# Patient Record
Sex: FEMALE | Race: WHITE | URBAN - METROPOLITAN AREA
[De-identification: names, ages, dates, MRNs, and addresses within clinical notes are randomized per-mention and may not be internally consistent; named-entity substitution may affect disease eponyms.]

---

## 2019-08-29 ENCOUNTER — FOLLOW UP ESTABLISHED (OUTPATIENT)
Dept: URBAN - METROPOLITAN AREA CLINIC 85 | Facility: CLINIC | Age: 59
End: 2019-08-29
Payer: COMMERCIAL

## 2019-08-29 DIAGNOSIS — H25.813 COMBINED FORMS OF AGE-RELATED CATARACT, BILATERAL: ICD-10-CM

## 2019-08-29 DIAGNOSIS — H40.1133 PRIMARY OPEN-ANGLE GLAUCOMA, BILATERAL, SEVERE STAGE: Primary | ICD-10-CM

## 2019-08-29 PROCEDURE — 92133 CPTRZD OPH DX IMG PST SGM ON: CPT | Performed by: OPHTHALMOLOGY

## 2019-08-29 PROCEDURE — 92014 COMPRE OPH EXAM EST PT 1/>: CPT | Performed by: OPHTHALMOLOGY

## 2019-08-29 RX ORDER — LATANOPROST 50 UG/ML
0.005 % SOLUTION OPHTHALMIC
Qty: 1 | Refills: 3 | Status: INACTIVE
Start: 2019-08-29 | End: 2019-12-06

## 2019-08-29 ASSESSMENT — INTRAOCULAR PRESSURE
OD: 30
OS: 30

## 2019-08-29 ASSESSMENT — VISUAL ACUITY
OD: 20/25
OS: 20/20

## 2019-09-26 ENCOUNTER — FOLLOW UP ESTABLISHED (OUTPATIENT)
Dept: URBAN - METROPOLITAN AREA CLINIC 85 | Facility: CLINIC | Age: 59
End: 2019-09-26
Payer: COMMERCIAL

## 2019-09-26 PROCEDURE — 92012 INTRM OPH EXAM EST PATIENT: CPT | Performed by: OPHTHALMOLOGY

## 2019-09-26 PROCEDURE — 92083 EXTENDED VISUAL FIELD XM: CPT | Performed by: OPHTHALMOLOGY

## 2019-09-26 ASSESSMENT — INTRAOCULAR PRESSURE
OD: 20
OS: 20

## 2019-12-05 ENCOUNTER — FOLLOW UP ESTABLISHED (OUTPATIENT)
Dept: URBAN - METROPOLITAN AREA CLINIC 85 | Facility: CLINIC | Age: 59
End: 2019-12-05
Payer: COMMERCIAL

## 2019-12-05 DIAGNOSIS — H40.1122 PRIMARY OPEN-ANGLE GLAUCOMA, LEFT EYE, MODERATE STAGE: ICD-10-CM

## 2019-12-05 DIAGNOSIS — H40.1113 PRIMARY OPEN-ANGLE GLAUCOMA, RIGHT EYE, SEVERE STAGE: Primary | ICD-10-CM

## 2019-12-05 PROCEDURE — 92012 INTRM OPH EXAM EST PATIENT: CPT | Performed by: OPHTHALMOLOGY

## 2019-12-05 ASSESSMENT — INTRAOCULAR PRESSURE
OD: 27
OS: 25

## 2020-10-08 ENCOUNTER — FOLLOW UP ESTABLISHED (OUTPATIENT)
Dept: URBAN - METROPOLITAN AREA CLINIC 85 | Facility: CLINIC | Age: 60
End: 2020-10-08
Payer: COMMERCIAL

## 2020-10-08 PROCEDURE — 99213 OFFICE O/P EST LOW 20 MIN: CPT | Performed by: OPHTHALMOLOGY

## 2020-10-08 ASSESSMENT — INTRAOCULAR PRESSURE
OS: 15
OD: 15

## 2021-09-13 DIAGNOSIS — M25.511 BILATERAL SHOULDER PAIN, UNSPECIFIED CHRONICITY: Primary | ICD-10-CM

## 2021-09-13 DIAGNOSIS — M25.512 BILATERAL SHOULDER PAIN, UNSPECIFIED CHRONICITY: Primary | ICD-10-CM

## 2021-09-14 ENCOUNTER — OFFICE VISIT (OUTPATIENT)
Dept: ORTHOPEDIC SURGERY | Age: 61
End: 2021-09-14
Payer: MEDICARE

## 2021-09-14 DIAGNOSIS — M75.101 TEAR OF RIGHT ROTATOR CUFF, UNSPECIFIED TEAR EXTENT, UNSPECIFIED WHETHER TRAUMATIC: Primary | ICD-10-CM

## 2021-09-14 DIAGNOSIS — M75.102 NONTRAUMATIC TEAR OF LEFT ROTATOR CUFF, UNSPECIFIED TEAR EXTENT: ICD-10-CM

## 2021-09-14 PROCEDURE — 99204 OFFICE O/P NEW MOD 45 MIN: CPT | Performed by: ORTHOPAEDIC SURGERY

## 2021-09-14 RX ORDER — MECLIZINE HYDROCHLORIDE 25 MG/1
25 TABLET ORAL 3 TIMES DAILY PRN
COMMUNITY

## 2021-09-14 RX ORDER — ASPIRIN 81 MG/1
81 TABLET ORAL DAILY
COMMUNITY

## 2021-09-14 RX ORDER — LATANOPROST 50 UG/ML
1 SOLUTION/ DROPS OPHTHALMIC NIGHTLY
COMMUNITY

## 2021-09-14 RX ORDER — GABAPENTIN 100 MG/1
100 CAPSULE ORAL 3 TIMES DAILY
COMMUNITY

## 2021-09-14 RX ORDER — ONDANSETRON 4 MG/1
4 TABLET, FILM COATED ORAL EVERY 12 HOURS PRN
COMMUNITY

## 2021-09-14 RX ORDER — PANTOPRAZOLE SODIUM 40 MG/1
40 TABLET, DELAYED RELEASE ORAL DAILY
COMMUNITY

## 2021-09-14 RX ORDER — HYDROXYZINE HYDROCHLORIDE 25 MG/1
25 TABLET, FILM COATED ORAL DAILY
COMMUNITY

## 2021-09-14 NOTE — PROGRESS NOTES
Chief Complaint   Patient presents with    Pain     NP Bilateral shoulder pain         HPI:    Patient is 64 y.o. female complaining of right worse than left bilateral shoulder pain and weakness for 6 months. She denies a specific traumatic injury to the right shoulder. Previous treatments include rest, ice, and anti-inflammatory medication and HEP without much relief. She denies any other orthopedic complaints. ROS:    Skin: (-) rash,(-) psoriasis,(-) eczema, (-)skin cancer. Neurologic: (-)numbness, (-)tingling, (-)headaches, (-) LOC. Cardiovascular: (-) Chest pain, (-) swelling in legs/feet, (-) SOB, (-) cramping in legs/feet with walking. All other review of systems negative except stated above or in HPI      No past medical history on file. No past surgical history on file. Current Outpatient Medications:     latanoprost (XALATAN) 0.005 % ophthalmic solution, 1 drop nightly, Disp: , Rfl:     meclizine (ANTIVERT) 25 MG tablet, Take 25 mg by mouth 3 times daily as needed, Disp: , Rfl:     hydrOXYzine (ATARAX) 25 MG tablet, Take 25 mg by mouth daily 1-2x nightly PRN sleep, Disp: , Rfl:     pantoprazole (PROTONIX) 40 MG tablet, Take 40 mg by mouth daily PRN, Disp: , Rfl:     ondansetron (ZOFRAN) 4 MG tablet, Take 4 mg by mouth every 12 hours as needed for Nausea or Vomiting PRN, Disp: , Rfl:     gabapentin (NEURONTIN) 100 MG capsule, Take 100 mg by mouth 3 times daily.  PRN, Disp: , Rfl:     aspirin 81 MG EC tablet, Take 81 mg by mouth daily, Disp: , Rfl:   Allergies   Allergen Reactions    Wellbutrin [Bupropion] Hives     Large hives    Seasonal Dermatitis     Social History     Socioeconomic History    Marital status: Unknown     Spouse name: Not on file    Number of children: Not on file    Years of education: Not on file    Highest education level: Not on file   Occupational History    Not on file   Tobacco Use    Smoking status: Current Every Day Smoker     Packs/day: 1.00    Smokeless tobacco: Never Used   Substance and Sexual Activity    Alcohol use: Yes     Alcohol/week: 1.0 standard drinks     Types: 1 Glasses of wine per week     Comment: occasional    Drug use: Never    Sexual activity: Not on file   Other Topics Concern    Not on file   Social History Narrative    Not on file     Social Determinants of Health     Financial Resource Strain:     Difficulty of Paying Living Expenses:    Food Insecurity:     Worried About Running Out of Food in the Last Year:     [address] in the Last Year:    Transportation Needs:     Lack of Transportation (Medical):  Lack of Transportation (Non-Medical):    Physical Activity:     Days of Exercise per Week:     Minutes of Exercise per Session:    Stress:     Feeling of Stress :    Social Connections:     Frequency of Communication with Friends and Family:     Frequency of Social Gatherings with Friends and Family:     Attends Judaism Services:     Active Member of Clubs or Organizations:     Attends Club or Organization Meetings:     Marital Status:    Intimate Partner Violence:     Fear of Current or Ex-Partner:     Emotionally Abused:     Physically Abused:     Sexually Abused:      No family history on file. Physical Exam:    There were no vitals taken for this visit. GENERAL: alert, appears stated age, cooperative, no acute distress    HEENT: Head is normocephalic, atraumatic. PERRLA. SKIN: Clean, dry, intact. There is not any cellulitis or cutaneous lesions noted in the upper extremities    PULMONARY: breathing is regular and unlabored, no acute distress    CV: The bilateral upper and lower extremities are warm and well-perfused with brisk capillary refill. 2+ pulses UE and LE bilateral.     PSYCHIATRY: Pleasant mood, appropriate behavior, follows commands    NEURO: Sensation is intact distally with light touch with no alteration.  Motor exam of the upper extremities show elbow flexion and extension, wrist flexion and extension, and finger abduction grossly intact 5/5. Upper extremity reflexes are bilaterally symmetrical and within normal limits. LYMPH: No lymphedema present distally in upper or lower extremity. MUSCULOSKELETAL:  Shoulder Exam:  Examination of the right shoulder shows: There is not a deformity. There is not erythema. There is not soft tissue swelling. Deltoid region is  tender to palpation. AC Joint is  tender to palpation. Clavicle is not tender to palpation. Bicipital Groove is  tender to palpation. Pectoralis  is not tender to palpation. Scapula/ trapezius is  tender to palpation. Left:  ROM Full, Strength: Supraspinatus 5/5, Infraspinatus 5/5, Subscapularis 5/5  Right:  /120/60, Strength: Supraspinatus 4/5, Infraspinatus 5/5, Subscapularis 5/5  Left Shoulder:  Crepitus:  no   Tenderness:  none   Effusion:   none   Impingement: negative   Empty Can:  negative   Speed's:  negative      Apprehension:  negative   Cross Arm Sign:  negative   [place]'s:  negative       Neer's:  negative      Belly Press Test:  negative      Drop Arm Test:  negative     Right Shoulder:  Crepitus:  no   Tenderness:  mild   Effusion:   non   Impingement: positive   Empty Can:  positive   Speed's: positive   Apprehension:  not tested   Cross Arm Sign:  positive   [place]'s:  positive      Neer's:  positive      Belly Press Test:  negative   Drop Arm Test:  positive           Imaging:  XR SHOULDER RIGHT (MIN 2 VIEWS)    Result Date: 9/14/2021  EXAMINATION: THREE XRAY VIEWS OF THE RIGHT SHOULDER 9/14/2021 11:48 am COMPARISON: None. HISTORY: ORDERING SYSTEM PROVIDED HISTORY: Bilateral shoulder pain, unspecified chronicity FINDINGS: No acute fracture or evidence of dislocation. There are mild osseous degenerative changes of the superolateral humeral head. No significant degenerative/arthritic changes are identified elsewhere. No retained radiopaque foreign body is identified.      1. patient. 50% or greater was spent counseling the patient.

## 2021-11-02 ENCOUNTER — OFFICE VISIT (OUTPATIENT)
Dept: ORTHOPEDIC SURGERY | Age: 61
End: 2021-11-02
Payer: MEDICARE

## 2021-11-02 VITALS — BODY MASS INDEX: 29.23 KG/M2 | HEIGHT: 63 IN | TEMPERATURE: 98 F | WEIGHT: 165 LBS

## 2021-11-02 DIAGNOSIS — M75.101 TEAR OF RIGHT ROTATOR CUFF, UNSPECIFIED TEAR EXTENT, UNSPECIFIED WHETHER TRAUMATIC: Primary | ICD-10-CM

## 2021-11-02 DIAGNOSIS — Z71.82 EXERCISE COUNSELING: ICD-10-CM

## 2021-11-02 DIAGNOSIS — M54.12 CERVICAL RADICULOPATHY: ICD-10-CM

## 2021-11-02 DIAGNOSIS — M75.102 NONTRAUMATIC TEAR OF LEFT ROTATOR CUFF, UNSPECIFIED TEAR EXTENT: ICD-10-CM

## 2021-11-02 PROCEDURE — 99214 OFFICE O/P EST MOD 30 MIN: CPT | Performed by: ORTHOPAEDIC SURGERY

## 2021-11-02 NOTE — PROGRESS NOTES
Chief Complaint   Patient presents with    Shoulder Pain     Bilateral Shoulder F/U, was called 3x for MRI appt and did not get MRI done. HPI:    Patient is 63 y.o. female complaining of right worse than left bilateral shoulder pain and weakness for 6 months. She denies a specific traumatic injury to the right shoulder. Previous treatments include rest, ice, and anti-inflammatory medication and HEP without much relief. She denies any other orthopedic complaints. However she states that the arm pain now shoots down her left lower extremity down to her fingertips. ROS:    Skin: (-) rash,(-) psoriasis,(-) eczema, (-)skin cancer. Neurologic: (-)numbness, (-)tingling, (-)headaches, (-) LOC. Cardiovascular: (-) Chest pain, (-) swelling in legs/feet, (-) SOB, (-) cramping in legs/feet with walking. All other review of systems negative except stated above or in HPI      No past medical history on file. No past surgical history on file. Current Outpatient Medications:     latanoprost (XALATAN) 0.005 % ophthalmic solution, 1 drop nightly, Disp: , Rfl:     meclizine (ANTIVERT) 25 MG tablet, Take 25 mg by mouth 3 times daily as needed, Disp: , Rfl:     hydrOXYzine (ATARAX) 25 MG tablet, Take 25 mg by mouth daily 1-2x nightly PRN sleep, Disp: , Rfl:     pantoprazole (PROTONIX) 40 MG tablet, Take 40 mg by mouth daily PRN, Disp: , Rfl:     ondansetron (ZOFRAN) 4 MG tablet, Take 4 mg by mouth every 12 hours as needed for Nausea or Vomiting PRN, Disp: , Rfl:     gabapentin (NEURONTIN) 100 MG capsule, Take 100 mg by mouth 3 times daily.  PRN, Disp: , Rfl:     aspirin 81 MG EC tablet, Take 81 mg by mouth daily, Disp: , Rfl:   Allergies   Allergen Reactions    Wellbutrin [Bupropion] Hives     Large hives    Seasonal Dermatitis     Social History     Socioeconomic History    Marital status: Unknown     Spouse name: Not on file    Number of children: Not on file    Years of education: Not on file    Highest education level: Not on file   Occupational History    Not on file   Tobacco Use    Smoking status: Current Every Day Smoker     Packs/day: 1.00    Smokeless tobacco: Never Used   Substance and Sexual Activity    Alcohol use: Yes     Alcohol/week: 1.0 standard drinks     Types: 1 Glasses of wine per week     Comment: occasional    Drug use: Never    Sexual activity: Not on file   Other Topics Concern    Not on file   Social History Narrative    Not on file     Social Determinants of Health     Financial Resource Strain:     Difficulty of Paying Living Expenses:    Food Insecurity:     Worried About Running Out of Food in the Last Year:     [address] in the Last Year:    Transportation Needs:     Lack of Transportation (Medical):  Lack of Transportation (Non-Medical):    Physical Activity:     Days of Exercise per Week:     Minutes of Exercise per Session:    Stress:     Feeling of Stress :    Social Connections:     Frequency of Communication with Friends and Family:     Frequency of Social Gatherings with Friends and Family:     Attends Mandaeism Services:     Active Member of Clubs or Organizations:     Attends Club or Organization Meetings:     Marital Status:    Intimate Partner Violence:     Fear of Current or Ex-Partner:     Emotionally Abused:     Physically Abused:     Sexually Abused:      No family history on file. Physical Exam:    Temp 98 °F (36.7 °C)   Ht 5' 3\" (1.6 m)   Wt 165 lb (74.8 kg)   BMI 29.23 kg/m²     GENERAL: alert, appears stated age, cooperative, no acute distress    HEENT: Head is normocephalic, atraumatic. PERRLA. SKIN: Clean, dry, intact. There is not any cellulitis or cutaneous lesions noted in the upper extremities    PULMONARY: breathing is regular and unlabored, no acute distress    CV: The bilateral upper and lower extremities are warm and well-perfused with brisk capillary refill.  2+ pulses UE and LE bilateral. PSYCHIATRY: Pleasant mood, appropriate behavior, follows commands    NEURO: Sensation is intact distally with light touch with no alteration. Motor exam of the upper extremities show elbow flexion and extension, wrist flexion and extension, and finger abduction grossly intact 5/5. Upper extremity reflexes are bilaterally symmetrical and within normal limits. LYMPH: No lymphedema present distally in upper or lower extremity. MUSCULOSKELETAL:  Shoulder Exam:  Examination of the right shoulder shows: There is not a deformity. There is not erythema. There is not soft tissue swelling. Deltoid region is  tender to palpation. AC Joint is  tender to palpation. Clavicle is not tender to palpation. Bicipital Groove is  tender to palpation. Pectoralis  is not tender to palpation. Scapula/ trapezius is  tender to palpation. Left:  ROM Full, Strength: Supraspinatus 5/5, Infraspinatus 5/5, Subscapularis 5/5  Right:  /120/60, Strength: Supraspinatus 4/5, Infraspinatus 5/5, Subscapularis 5/5  Left Shoulder:  Crepitus:  no   Tenderness:  none   Effusion:   none   Impingement: negative   Empty Can:  negative   Speed's:  negative      Apprehension:  negative   Cross Arm Sign:  negative   [place]'s:  negative       Neer's:  negative      Belly Press Test:  negative      Drop Arm Test:  negative     Right Shoulder:  Crepitus:  no   Tenderness:  mild   Effusion:   non   Impingement: positive   Empty Can:  positive   Speed's: positive   Apprehension:  not tested   Cross Arm Sign:  positive   [place]'s:  positive      Neer's:  positive      Belly Press Test:  negative   Drop Arm Test:  positive           Imaging:  XR SHOULDER RIGHT (MIN 2 VIEWS)    Result Date: 9/14/2021  EXAMINATION: THREE XRAY VIEWS OF THE RIGHT SHOULDER 9/14/2021 11:48 am COMPARISON: None. HISTORY: ORDERING SYSTEM PROVIDED HISTORY: Bilateral shoulder pain, unspecified chronicity FINDINGS: No acute fracture or evidence of dislocation.   There are mild osseous degenerative changes of the superolateral humeral head. No significant degenerative/arthritic changes are identified elsewhere. No retained radiopaque foreign body is identified. 1.  No acute osseous abnormality is identified. 2. Mild osseous degenerative changes of the superolateral humeral head are suspicious for underlying rotator cuff pathology. XR SHOULDER LEFT (MIN 2 VIEWS)    Result Date: 9/14/2021  EXAMINATION: THREE XRAY VIEWS OF THE LEFT SHOULDER 9/14/2021 11:48 am COMPARISON: None. HISTORY: ORDERING SYSTEM PROVIDED HISTORY: Bilateral shoulder pain, unspecified chronicity FINDINGS: No acute fracture or evidence of dislocation. There is minimal cortical remodeling of the superolateral humeral head and acromion. No significant osseous degenerative/arthritic changes are identified elsewhere. No retained radiopaque foreign body is identified. 1.  No acute osseous abnormality is identified. 2. Minimal osseous degenerative changes at the lateral shoulder. Pankaj Ward was seen today for shoulder pain. Diagnoses and all orders for this visit:    Tear of right rotator cuff, unspecified tear extent, unspecified whether traumatic  -     MRI SHOULDER RIGHT WO CONTRAST; Future    Nontraumatic tear of left rotator cuff, unspecified tear extent  -     MRI SHOULDER LEFT WO CONTRAST; Future    Cervical radiculopathy  -     Ambulatory referral to [address]        Patient seen and examined. X-rays reviewed. Patient has exam and history consistent with rotator cuff pathology. MRI recommended for further evaluation and management of possible rotator cuff tear. Return to clinic after MRI  Medical chart was reviewed from previous physicians showing treatment but no MRI; therefore new MRI is ordered. Patient was referred to physical medicine rehabilitation for evaluation of possible cervical radiculopathy.     In a 15 minute assessment and discussion, patient was counseled on continued weight loss, diet, and physical activity relating to this condition. She was educated with options in detail including nutrition, joining a health club/ weight loss program, and use of cardio equipment such as the Arc Trainer and the importance of use as well as range of motion and HEP exercises for weight loss. Patient educated about the healing rates with this condition. Patient does smoke and does have secondhand smoke exposure. In this discussion of approximately 5 minutes, patient was counseled about decrease in smoking exposure regards to healing and overall good health. Patient seems reluctant but is willing to listen to the discussion in detail. She states that she will try to cut down as much as possible and states that she will try to quit completely by the next visit. Servando Boucher, DO              25 minutes was spent with patient. 50% or greater was spent counseling the patient.

## 2021-11-17 ENCOUNTER — HOSPITAL ENCOUNTER (OUTPATIENT)
Dept: MRI IMAGING | Age: 61
Discharge: HOME | End: 2021-11-19
Payer: MEDICARE

## 2021-11-17 DIAGNOSIS — M75.102 NONTRAUMATIC TEAR OF LEFT ROTATOR CUFF, UNSPECIFIED TEAR EXTENT: ICD-10-CM

## 2021-11-17 DIAGNOSIS — M75.101 TEAR OF RIGHT ROTATOR CUFF, UNSPECIFIED TEAR EXTENT, UNSPECIFIED WHETHER TRAUMATIC: ICD-10-CM

## 2021-11-17 PROCEDURE — 73221 MRI JOINT UPR EXTREM W/O DYE: CPT

## 2021-11-18 ENCOUNTER — TELEPHONE (OUTPATIENT)
Dept: ORTHOPEDIC SURGERY | Age: 61
End: 2021-11-18

## 2021-11-18 NOTE — TELEPHONE ENCOUNTER
Patient notifying office that left shoulder MRI was only done because patient could not lay through the right one due to pain.

## 2021-11-28 NOTE — PROGRESS NOTES
Chief Complaint   Patient presents with    Shoulder Pain     left shoulder MRI Results         HPI:    Patient is 63 y.o. female complaining of right worse than left bilateral shoulder pain and weakness for 6 months. She denies a specific traumatic injury to the right shoulder. Previous treatments include rest, ice, and anti-inflammatory medication and HEP without much relief. She denies any other orthopedic complaints. However she states that the arm pain now shoots down her left lower extremity down to her fingertips. Follows up after MRI. ROS:    Skin: (-) rash,(-) psoriasis,(-) eczema, (-)skin cancer. Neurologic: (-)numbness, (-)tingling, (-)headaches, (-) LOC. Cardiovascular: (-) Chest pain, (-) swelling in legs/feet, (-) SOB, (-) cramping in legs/feet with walking. All other review of systems negative except stated above or in HPI      No past medical history on file. No past surgical history on file. Current Outpatient Medications:     latanoprost (XALATAN) 0.005 % ophthalmic solution, 1 drop nightly, Disp: , Rfl:     meclizine (ANTIVERT) 25 MG tablet, Take 25 mg by mouth 3 times daily as needed, Disp: , Rfl:     hydrOXYzine (ATARAX) 25 MG tablet, Take 25 mg by mouth daily 1-2x nightly PRN sleep, Disp: , Rfl:     pantoprazole (PROTONIX) 40 MG tablet, Take 40 mg by mouth daily PRN, Disp: , Rfl:     ondansetron (ZOFRAN) 4 MG tablet, Take 4 mg by mouth every 12 hours as needed for Nausea or Vomiting PRN, Disp: , Rfl:     gabapentin (NEURONTIN) 100 MG capsule, Take 100 mg by mouth 3 times daily.  PRN, Disp: , Rfl:     aspirin 81 MG EC tablet, Take 81 mg by mouth daily, Disp: , Rfl:   Allergies   Allergen Reactions    Wellbutrin [Bupropion] Hives     Large hives    Seasonal Dermatitis     Social History     Socioeconomic History    Marital status: Unknown     Spouse name: Not on file    Number of children: Not on file    Years of education: Not on file    Highest education level: Not on file   Occupational History    Not on file   Tobacco Use    Smoking status: Current Every Day Smoker     Packs/day: 1.00    Smokeless tobacco: Never Used   Substance and Sexual Activity    Alcohol use: Yes     Alcohol/week: 1.0 standard drinks     Types: 1 Glasses of wine per week     Comment: occasional    Drug use: Never    Sexual activity: Not on file   Other Topics Concern    Not on file   Social History Narrative    Not on file     Social Determinants of Health     Financial Resource Strain:     Difficulty of Paying Living Expenses:    Food Insecurity:     Worried About Running Out of Food in the Last Year:     [address] in the Last Year:    Transportation Needs:     Lack of Transportation (Medical):  Lack of Transportation (Non-Medical):    Physical Activity:     Days of Exercise per Week:     Minutes of Exercise per Session:    Stress:     Feeling of Stress :    Social Connections:     Frequency of Communication with Friends and Family:     Frequency of Social Gatherings with Friends and Family:     Attends [employer] Services:     Active Member of Clubs or Organizations:     Attends Club or Organization Meetings:     Marital Status:    Intimate Partner Violence:     Fear of Current or Ex-Partner:     Emotionally Abused:     Physically Abused:     Sexually Abused:      No family history on file. Physical Exam:    Temp 98 °F (36.7 °C)   Ht 5' 3\" (1.6 m)   Wt 165 lb (74.8 kg)   BMI 29.23 kg/m²     GENERAL: alert, appears stated age, cooperative, no acute distress    HEENT: Head is normocephalic, atraumatic. PERRLA. SKIN: Clean, dry, intact. There is not any cellulitis or cutaneous lesions noted in the upper extremities    PULMONARY: breathing is regular and unlabored, no acute distress    CV: The bilateral upper and lower extremities are warm and well-perfused with brisk capillary refill.  2+ pulses UE and LE bilateral.     PSYCHIATRY: Pleasant mood, appropriate behavior, follows commands    NEURO: Sensation is intact distally with light touch with no alteration. Motor exam of the upper extremities show elbow flexion and extension, wrist flexion and extension, and finger abduction grossly intact 5/5. Upper extremity reflexes are bilaterally symmetrical and within normal limits. LYMPH: No lymphedema present distally in upper or lower extremity. MUSCULOSKELETAL:  Shoulder Exam:  Examination of the right shoulder shows: There is not a deformity. There is not erythema. There is not soft tissue swelling. Deltoid region is  tender to palpation. AC Joint is  tender to palpation. Clavicle is not tender to palpation. Bicipital Groove is  tender to palpation. Pectoralis  is not tender to palpation. Scapula/ trapezius is  tender to palpation. Left:  ROM Full, Strength: Supraspinatus 5/5, Infraspinatus 5/5, Subscapularis 5/5  Right:  /120/60, Strength: Supraspinatus 4/5, Infraspinatus 5/5, Subscapularis 5/5  Left Shoulder:  Crepitus:  no   Tenderness:  none   Effusion:   none   Impingement: negative   Empty Can:  negative   Speed's:  negative      Apprehension:  negative   Cross Arm Sign:  negative   [facility]:  negative       Neer's:  negative      Belly Press Test:  negative      Drop Arm Test:  negative     Right Shoulder:  Crepitus:  no   Tenderness:  mild   Effusion:   non   Impingement: positive   Empty Can:  positive   Speed's: positive   Apprehension:  not tested   Cross Arm Sign:  positive   [facility]:  positive      Neer's:  positive      Belly Press Test:  negative   Drop Arm Test:  positive           no change since last visit. Imaging:  XR SHOULDER RIGHT (MIN 2 VIEWS)    Result Date: 9/14/2021  EXAMINATION: THREE XRAY VIEWS OF THE RIGHT SHOULDER 9/14/2021 11:48 am COMPARISON: None. HISTORY: ORDERING SYSTEM PROVIDED HISTORY: Bilateral shoulder pain, unspecified chronicity FINDINGS: No acute fracture or evidence of dislocation.   There are mild osseous degenerative changes of the superolateral humeral head. No significant degenerative/arthritic changes are identified elsewhere. No retained radiopaque foreign body is identified. 1.  No acute osseous abnormality is identified. 2. Mild osseous degenerative changes of the superolateral humeral head are suspicious for underlying rotator cuff pathology. XR SHOULDER LEFT (MIN 2 VIEWS)    Result Date: 9/14/2021  EXAMINATION: THREE XRAY VIEWS OF THE LEFT SHOULDER 9/14/2021 11:48 am COMPARISON: None. HISTORY: ORDERING SYSTEM PROVIDED HISTORY: Bilateral shoulder pain, unspecified chronicity FINDINGS: No acute fracture or evidence of dislocation. There is minimal cortical remodeling of the superolateral humeral head and acromion. No significant osseous degenerative/arthritic changes are identified elsewhere. No retained radiopaque foreign body is identified. 1.  No acute osseous abnormality is identified. 2. Minimal osseous degenerative changes at the lateral shoulder. MRI SHOULDER LEFT WO CONTRAST    Result Date: 11/18/2021  EXAMINATION: MRI OF THE LEFT SHOULDER WITHOUT CONTRAST   11/17/2021 5:28 pm TECHNIQUE: Multiplanar multisequence MRI of the left shoulder was performed without the administration of intravenous contrast. COMPARISON: None. HISTORY: ORDERING SYSTEM PROVIDED HISTORY: Nontraumatic tear of left rotator cuff, unspecified tear extent FINDINGS: ROTATOR CUFF: 13 x 7 mm intermediate grade partial-thickness interstitial tear of the supraspinatus tendon critical zone. Background of tendinosis. There are some torn muscle fibers near the myotendinous junction of the anterior supraspinatus tendon seen on page 10 and 9 of series 4. Otherwise grossly intact subscapularis, infraspinatus and teres minor tendons. No full-thickness retracted tear. No significant muscle atrophy. BICEPS TENDON: Intact vertical and horizontal portions of the long head of the biceps tendon.  LABRUM: Tear of the anterior labrum extending along the anterior superior and anterior inferior labrum. Degenerative signal of the posterior labrum as well. GLENOHUMERAL JOINT: No significant joint effusion. Unremarkable alignment. No significant subchondral cystic changes. Suspect mild cartilage thinning at the inferior glenohumeral joint. AC JOINT AND ACROMIOCLAVICULAR ARCH: Acromion shape: The acromion is curved. Subacromial spur: No Arthritis: Capsular hypertrophy with osteophytes and reactive bone marrow edema BONE MARROW: No evidence of fracture. Normal marrow signal. OUTLET SPACES: Mild subacromial/subdeltoid bursitis. Unremarkable MRI appearance of the quadrilateral space. Mild narrowing of the supraspinatus outlet. Normal appearing rotator interval.     1. Intermediate grade partial-thickness interstitial tear of the supraspinatus tendon footprint and strain/muscle tear with mild muscle edema near the myotendinous junction. No full-thickness retracted tear or muscle atrophy. 2. Multifocal likely degenerative tearing of the labrum. 3. Impingement factors with mild subacromial/subdeltoid bursitis. Clarisa Contreras was seen today for shoulder pain. Diagnoses and all orders for this visit:    Clarisa Contreras was seen today for shoulder pain. Diagnoses and all orders for this visit:    Tear of right rotator cuff, unspecified tear extent, unspecified whether traumatic    Nontraumatic tear of left rotator cuff, unspecified tear extent    Cervical radiculopathy    Exercise counseling    Other orders  -     naproxen (NAPROSYN) 500 MG tablet; Take 1 tablet by mouth 2 times daily (with meals)              Patient seen and examined. X-rays reviewed. Patient has exam and history consistent with rotator cuff pathology. MRI recommended for further evaluation and management of possible rotator cuff tear.   Return to clinic after MRI  Medical chart was reviewed from previous physicians showing treatment but no MRI; therefore new MRI is ordered. Patient was referred to physical medicine rehabilitation for evaluation of possible cervical radiculopathy. In a 15 minute assessment and discussion, patient was counseled on continued weight loss, diet, and physical activity relating to this condition. She was educated with options in detail including nutrition, joining a health club/ weight loss program, and use of cardio equipment such as the Arc Trainer and the importance of use as well as range of motion and HEP exercises for weight loss. Patient educated about the healing rates with this condition. Patient does smoke and does have secondhand smoke exposure. In this discussion of approximately 5 minutes, patient was counseled about decrease in smoking exposure regards to healing and overall good health. Patient seems reluctant but is willing to listen to the discussion in detail. She states that she will try to cut down as much as possible and states that she will try to quit completely by the next visit. Shweta Jama, DO              25 minutes was spent with patient. 50% or greater was spent counseling the patient.

## 2021-11-29 ENCOUNTER — OFFICE VISIT (OUTPATIENT)
Dept: PHYSICAL MEDICINE AND REHAB | Age: 61
End: 2021-11-29
Payer: MEDICARE

## 2021-11-29 VITALS
SYSTOLIC BLOOD PRESSURE: 115 MMHG | HEIGHT: 63 IN | BODY MASS INDEX: 28.99 KG/M2 | HEART RATE: 71 BPM | WEIGHT: 163.6 LBS | DIASTOLIC BLOOD PRESSURE: 71 MMHG

## 2021-11-29 DIAGNOSIS — M54.2 CHRONIC NECK PAIN: Primary | ICD-10-CM

## 2021-11-29 DIAGNOSIS — G89.29 CHRONIC NECK PAIN: Primary | ICD-10-CM

## 2021-11-29 DIAGNOSIS — S13.4XXD WHIPLASH INJURY TO NECK, SUBSEQUENT ENCOUNTER: ICD-10-CM

## 2021-11-29 DIAGNOSIS — M75.102 NON-TRAUMATIC ROTATOR CUFF TEAR, LEFT: ICD-10-CM

## 2021-11-29 PROCEDURE — 99204 OFFICE O/P NEW MOD 45 MIN: CPT | Performed by: PHYSICAL MEDICINE & REHABILITATION
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
## 2021-11-30 ENCOUNTER — OFFICE VISIT (OUTPATIENT)
Dept: ORTHOPEDIC SURGERY | Age: 61
End: 2021-11-30
Payer: MEDICARE

## 2021-11-30 VITALS — HEIGHT: 63 IN | BODY MASS INDEX: 28.88 KG/M2 | WEIGHT: 163 LBS | TEMPERATURE: 98 F

## 2021-11-30 DIAGNOSIS — M75.101 TEAR OF RIGHT ROTATOR CUFF, UNSPECIFIED TEAR EXTENT, UNSPECIFIED WHETHER TRAUMATIC: Primary | ICD-10-CM

## 2021-11-30 DIAGNOSIS — M54.12 CERVICAL RADICULOPATHY: ICD-10-CM

## 2021-11-30 DIAGNOSIS — M75.102 NONTRAUMATIC TEAR OF LEFT ROTATOR CUFF, UNSPECIFIED TEAR EXTENT: ICD-10-CM

## 2021-11-30 DIAGNOSIS — Z71.82 EXERCISE COUNSELING: ICD-10-CM

## 2021-11-30 PROCEDURE — 99214 OFFICE O/P EST MOD 30 MIN: CPT | Performed by: ORTHOPAEDIC SURGERY

## 2021-11-30 RX ORDER — NAPROXEN 500 MG/1
500 TABLET ORAL 2 TIMES DAILY WITH MEALS
Qty: 60 TABLET | Refills: 0 | Status: SHIPPED | OUTPATIENT
Start: 2021-11-30 | End: 2021-12-30

## 2021-12-01 ENCOUNTER — TELEPHONE (OUTPATIENT)
Dept: PHYSICAL MEDICINE AND REHAB | Age: 61
End: 2021-12-01

## 2021-12-01 NOTE — TELEPHONE ENCOUNTER
----- Message from Satya Parks DO sent at 12/1/2021  9:07 AM EST -----  Reviewed test abnormal, inform patient that it showed arthritis.

## 2021-12-03 ENCOUNTER — OFFICE VISIT (OUTPATIENT)
Dept: PHYSICAL MEDICINE AND REHAB | Age: 61
End: 2021-12-03
Payer: MEDICARE

## 2021-12-03 VITALS — BODY MASS INDEX: 28.88 KG/M2 | HEIGHT: 63 IN | WEIGHT: 163 LBS

## 2021-12-03 DIAGNOSIS — M54.12 CERVICAL RADICULOPATHY: Primary | ICD-10-CM

## 2021-12-03 PROCEDURE — 95910 NRV CNDJ TEST 7-8 STUDIES: CPT | Performed by: PHYSICAL MEDICINE & REHABILITATION

## 2021-12-03 PROCEDURE — 95886 MUSC TEST DONE W/N TEST COMP: CPT | Performed by: PHYSICAL MEDICINE & REHABILITATION

## 2021-12-03 NOTE — PROGRESS NOTES
[address] [facility]  Electrodiagnostic Laboratory  *Accredited by the [facility] with exemplary status  [address] [facility]  Phone: [phone]  Fax: [phone]    Referring Provider: Seema Robledo DO  Primary Care Physician: ALPESH Bland CNP  Patient Name: Kristofer Welch  Patient YOB: 1960  Gender: female  BMI: Body mass index is 28.87 kg/m². Height 5' 3\" (1.6 m), weight 163 lb (73.9 kg). 12/3/2021    Description of clinical problem:   Chief Complaint   Patient presents with    Extremity Pain     left shoulder, right arm radiating down the arms and into the hands. Pain described as sharp and electric. Pain rated 7/10. Symptoms for 3 months with no acute injury.  Numbness     numbness in hands at times. left worse than right.  Extremity Weakness     weakness in bilateral upper extremities. Sensory NCS      Nerve / Sites Rec. Site Peak Lat PP Amp Segments Distance Velocity Temp. ms µV  cm m/s °C   L Median - Digit II (Antidromic)      Palm Dig II 2.03 60.2 Palm - Dig II 7 54 32      Wrist Dig II 3.65 59.5 Wrist - Dig II 14 50 32   L Ulnar - Digit V (Antidromic)      Wrist Dig V 3.80 43.1 Wrist - Dig V 14 48 32   L Radial - Anatomical snuff box (Forearm)      Forearm Wrist 2.60 19.1 Forearm - Wrist 10 53 32       Motor NCS      Nerve / Sites Muscle Onset Amplitude Segments Distance Velocity Temp.     ms mV  cm m/s °C   L Median - APB      Palm APB 2.14 12.7 Palm - APB   32      Wrist APB 3.70 9.3 Wrist - Palm 8 51 32      Elbow APB 7.29 9.1 Elbow - Wrist 18 50 32   L Ulnar - ADM      Wrist ADM 3.23 8.7 Wrist - ADM 8  32      B. Elbow ADM 6.20 7.2 B. Elbow - Wrist 17 57 32      A. Elbow ADM 7.81 7.1 A. Elbow - B. Elbow 10 62 32   L Musculocutaneous - Biceps      Erb's Pt Biceps 3.96 4.6       R Musculocutaneous - Biceps      Erb's Pt Biceps 4.11 4.1           F  Wave      Nerve Fmin % F    ms %   L Median - APB 27.08 40   L Ulnar - ADM 26.20 90       EMG      EMG Summary Table     Spontaneous MUAP Recruitment   Muscle Nerve Roots IA Fib PSW Fasc Amp Dur. PPP Pattern   L. Biceps brachii Musculocutaneous C5-C6 N None None None N N N N   L. Triceps brachii Radial C6-C8 N None None None N N N N   L. Pronator teres Median C6-C7 N None None None N N N N   L. First dorsal interosseous Ulnar C8-T1 N None None None N N N N   L. Abductor pollicis brevis Median V4-Q1 N None None None N N N N   L. Cervical paraspinals (low)  - N None None None N N N N   L. Cervical paraspinals (mid)  - N None None None N N N N      Study Limitations:  none    Summary of Findings:   Nerve conduction studies:   Needle electromyography of the left upper extremity including screening C5 through T1 myotome including the paraspinals as described in the table demonstrated normal insertional activity without spontaneous activity, and motor unit recruitment was normal with motor units of normal amplitude, phases and duration. Needle EMG:   · Needle EMG was performed using a concentric needle. Needle electromyography of the left upper extremity including screening C5 through T1 myotome including the paraspinals as described in the table demonstrated normal insertional activity without spontaneous activity, and motor unit recruitment was normal with motor units of normal amplitude, phases and duration. Diagnostic Interpretation: This study was normal. Specific to the question, there is no electrodiagnostic evidence of a left C5 through T1 radiculopathy. Previous Study: none      Follow up EMG is recommended if clinically warranted. Technologist: Sharon Jasso LPN  Physician:    Ty Márquez D.O., P.T.   Board Certified Physical Medicine and Rehabilitation  Board Certified Electrodiagnostic Medicine      Nerve conduction studies and electromyography were performed according to our laboratory policies and procedures which can be provided upon request. All abnormal values are identified in the table.  Laboratory normal values can also be provided upon request.       Cc: Johan Morgan, DO Manuel Chacon, ALPESH - CNP

## 2021-12-03 NOTE — PATIENT INSTRUCTIONS
Electrodiagnotic Laboratory  Accredited by the [facility] with Exemplary status  ALIN Cottrell D.O. [facility]  [address]  Phone: [phone]  Fax: [phone]        Today you had an electrodiagnostic exam which included nerve conduction studies (NCS) and electromyography (EMG). This test evaluated the electrical activity of your nerves and muscles to help determine if you have a nerve or muscle disease. This test can help determine the location and type of a nerve or muscle problem. This will help your referring doctor diagnose your condition and determine the appropriate next step in your treatment plan. After your test:    1. There are no long lasting side effects of the test.     2. You may resume your normal activities without restrictions. 3.  Resume any medications that were stopped for the test.     4  If you have sore areas or bruising in your muscles where the needle was placed, apply a cold pack to the sore area for 15-20 minutes three to four times a day as needed for pain. The soreness should go away in about 1-2 days. 5. Your results were provided  Briefly at the end of your test and the final detailed report will be provided to your referring physician, and/or primary care physician and any other parties you requested within 1-2 days of the examination. You may wish to contact your referring provider after a few days to determine what they would like you to do next. 6.  Please call [phone] with any questions or concerns and if you develop increased body temperature/fever, swelling, tenderness, increased pain and/or drainage from the sites where the needle was placed. Thank you for choosing us for your health care needs.

## 2021-12-06 NOTE — TELEPHONE ENCOUNTER
Called and spoke with the patient and informed her that her xray cervical spine showed arthritis. Patient is aware and voiced understanding.

## 2021-12-21 ENCOUNTER — OFFICE VISIT (OUTPATIENT)
Dept: ORTHOPEDIC SURGERY | Age: 61
End: 2021-12-21
Payer: MEDICARE

## 2021-12-21 VITALS — BODY MASS INDEX: 28.88 KG/M2 | WEIGHT: 163 LBS | HEIGHT: 63 IN | TEMPERATURE: 98 F

## 2021-12-21 DIAGNOSIS — M75.102 NONTRAUMATIC TEAR OF LEFT ROTATOR CUFF, UNSPECIFIED TEAR EXTENT: Primary | ICD-10-CM

## 2021-12-21 DIAGNOSIS — M75.42 IMPINGEMENT SYNDROME OF LEFT SHOULDER: ICD-10-CM

## 2021-12-21 DIAGNOSIS — Z71.82 EXERCISE COUNSELING: ICD-10-CM

## 2021-12-21 PROCEDURE — 20610 DRAIN/INJ JOINT/BURSA W/O US: CPT | Performed by: ORTHOPAEDIC SURGERY

## 2021-12-21 PROCEDURE — 99214 OFFICE O/P EST MOD 30 MIN: CPT | Performed by: ORTHOPAEDIC SURGERY

## 2021-12-21 RX ORDER — TRIAMCINOLONE ACETONIDE 40 MG/ML
40 INJECTION, SUSPENSION INTRA-ARTICULAR; INTRAMUSCULAR ONCE
Status: COMPLETED | OUTPATIENT
Start: 2021-12-21 | End: 2021-12-21

## 2021-12-21 RX ADMIN — TRIAMCINOLONE ACETONIDE 40 MG: 40 INJECTION, SUSPENSION INTRA-ARTICULAR; INTRAMUSCULAR at 14:34

## 2021-12-21 NOTE — PROGRESS NOTES
Chief Complaint   Patient presents with    Shoulder Pain     Left shoulder f/u. Would like to discuss surgery options at this point. EMG performed by Dr. Idalia Lynn. HPI:    Patient is 63 y.o. female complaining of right worse than left bilateral shoulder pain and weakness for 6 months. She denies a specific traumatic injury to the right shoulder. Previous treatments include rest, ice, and anti-inflammatory medication and HEP without much relief. She denies any other orthopedic complaints. However she states that the arm pain now shoots down her left lower extremity down to her fingertips. Follows up after MRI. ROS:    Skin: (-) rash,(-) psoriasis,(-) eczema, (-)skin cancer. Neurologic: (-)numbness, (-)tingling, (-)headaches, (-) LOC. Cardiovascular: (-) Chest pain, (-) swelling in legs/feet, (-) SOB, (-) cramping in legs/feet with walking. All other review of systems negative except stated above or in HPI      No past medical history on file. No past surgical history on file. Current Outpatient Medications:     latanoprost (XALATAN) 0.005 % ophthalmic solution, 1 drop nightly, Disp: , Rfl:     meclizine (ANTIVERT) 25 MG tablet, Take 25 mg by mouth 3 times daily as needed, Disp: , Rfl:     hydrOXYzine (ATARAX) 25 MG tablet, Take 25 mg by mouth daily 1-2x nightly PRN sleep, Disp: , Rfl:     pantoprazole (PROTONIX) 40 MG tablet, Take 40 mg by mouth daily PRN, Disp: , Rfl:     ondansetron (ZOFRAN) 4 MG tablet, Take 4 mg by mouth every 12 hours as needed for Nausea or Vomiting PRN, Disp: , Rfl:     gabapentin (NEURONTIN) 100 MG capsule, Take 100 mg by mouth 3 times daily.  PRN, Disp: , Rfl:     aspirin 81 MG EC tablet, Take 81 mg by mouth daily, Disp: , Rfl:   Allergies   Allergen Reactions    Wellbutrin [Bupropion] Hives     Large hives    Seasonal Dermatitis     Social History     Socioeconomic History    Marital status: Unknown     Spouse name: Not on file    Number of children: Not on file    Years of education: Not on file    Highest education level: Not on file   Occupational History    Not on file   Tobacco Use    Smoking status: Current Every Day Smoker     Packs/day: 1.00    Smokeless tobacco: Never Used   Substance and Sexual Activity    Alcohol use: Yes     Alcohol/week: 1.0 standard drinks     Types: 1 Glasses of wine per week     Comment: occasional    Drug use: Never    Sexual activity: Not on file   Other Topics Concern    Not on file   Social History Narrative    Not on file     Social Determinants of Health     Financial Resource Strain:     Difficulty of Paying Living Expenses:    Food Insecurity:     Worried About Running Out of Food in the Last Year:     [address] in the Last Year:    Transportation Needs:     Lack of Transportation (Medical):  Lack of Transportation (Non-Medical):    Physical Activity:     Days of Exercise per Week:     Minutes of Exercise per Session:    Stress:     Feeling of Stress :    Social Connections:     Frequency of Communication with Friends and Family:     Frequency of Social Gatherings with Friends and Family:     Attends Jew Services:     Active Member of Clubs or Organizations:     Attends Club or Organization Meetings:     Marital Status:    Intimate Partner Violence:     Fear of Current or Ex-Partner:     Emotionally Abused:     Physically Abused:     Sexually Abused:      No family history on file. Physical Exam:    Temp 98 °F (36.7 °C)   Ht 5' 3\" (1.6 m)   Wt 165 lb (74.8 kg)   BMI 29.23 kg/m²     GENERAL: alert, appears stated age, apprehensive mildly uncooperative today with occasional cursing improved after redirection she was became more cooperative, no acute distress    HEENT: Head is normocephalic, atraumatic. PERRLA. SKIN: Clean, dry, intact.  There is not any cellulitis or cutaneous lesions noted in the upper extremities    PULMONARY: breathing is regular and unlabored, no acute distress    CV: The bilateral upper and lower extremities are warm and well-perfused with brisk capillary refill. 2+ pulses UE and LE bilateral.     PSYCHIATRY: Pleasant mood, appropriate behavior, follows commands    NEURO: Sensation is intact distally with light touch with no alteration. Motor exam of the upper extremities show elbow flexion and extension, wrist flexion and extension, and finger abduction grossly intact 5/5. Upper extremity reflexes are bilaterally symmetrical and within normal limits. LYMPH: No lymphedema present distally in upper or lower extremity. MUSCULOSKELETAL:  Shoulder Exam:  Examination of the right shoulder shows: There is not a deformity. There is not erythema. There is not soft tissue swelling. Deltoid region is  tender to palpation. AC Joint is  tender to palpation. Clavicle is not tender to palpation. Bicipital Groove is  tender to palpation. Pectoralis  is not tender to palpation. Scapula/ trapezius is  tender to palpation. Left:  ROM Full, Strength: Supraspinatus 5/5, Infraspinatus 5/5, Subscapularis 5/5  Right:  /120/60, Strength: Supraspinatus 4/5, Infraspinatus 5/5, Subscapularis 5/5  Left Shoulder:  Crepitus:  no   Tenderness:  none   Effusion:   none   Impingement: negative   Empty Can:  negative   Speed's:  negative      Apprehension:  negative   Cross Arm Sign:  negative   Luna's:  negative       Neer's:  negative      Belly Press Test:  negative      Drop Arm Test:  negative     Right Shoulder:  Crepitus:  no   Tenderness:  mild   Effusion:   non   Impingement: positive   Empty Can:  positive   Speed's: positive   Apprehension:  not tested   Cross Arm Sign:  positive   Luna's:  positive      Neer's:  positive      Belly Press Test:  negative   Drop Arm Test:  positive           no change since last visit.     Imaging:  XR SHOULDER RIGHT (MIN 2 VIEWS)    Result Date: 9/14/2021  EXAMINATION: THREE XRAY VIEWS OF THE RIGHT SHOULDER 9/14/2021 11:48 am COMPARISON: None. HISTORY: ORDERING SYSTEM PROVIDED HISTORY: Bilateral shoulder pain, unspecified chronicity FINDINGS: No acute fracture or evidence of dislocation. There are mild osseous degenerative changes of the superolateral humeral head. No significant degenerative/arthritic changes are identified elsewhere. No retained radiopaque foreign body is identified. 1.  No acute osseous abnormality is identified. 2. Mild osseous degenerative changes of the superolateral humeral head are suspicious for underlying rotator cuff pathology. XR SHOULDER LEFT (MIN 2 VIEWS)    Result Date: 9/14/2021  EXAMINATION: THREE XRAY VIEWS OF THE LEFT SHOULDER 9/14/2021 11:48 am COMPARISON: None. HISTORY: ORDERING SYSTEM PROVIDED HISTORY: Bilateral shoulder pain, unspecified chronicity FINDINGS: No acute fracture or evidence of dislocation. There is minimal cortical remodeling of the superolateral humeral head and acromion. No significant osseous degenerative/arthritic changes are identified elsewhere. No retained radiopaque foreign body is identified. 1.  No acute osseous abnormality is identified. 2. Minimal osseous degenerative changes at the lateral shoulder. Impression    Diagnostic Interpretation: This study was normal. Specific to the question, there is no electrodiagnostic evidence of a left C5 through T1 radiculopathy.       Previous Study: none         Follow up EMG is recommended if clinically warranted.              MRI SHOULDER LEFT WO CONTRAST    Result Date: 11/18/2021  EXAMINATION: MRI OF THE LEFT SHOULDER WITHOUT CONTRAST   11/17/2021 5:28 pm TECHNIQUE: Multiplanar multisequence MRI of the left shoulder was performed without the administration of intravenous contrast. COMPARISON: None.  HISTORY: ORDERING SYSTEM PROVIDED HISTORY: Nontraumatic tear of left rotator cuff, unspecified tear extent FINDINGS: ROTATOR CUFF: 13 x 7 mm intermediate grade partial-thickness interstitial tear of the supraspinatus tendon critical zone. Background of tendinosis. There are some torn muscle fibers near the myotendinous junction of the anterior supraspinatus tendon seen on page 10 and 9 of series 4. Otherwise grossly intact subscapularis, infraspinatus and teres minor tendons. No full-thickness retracted tear. No significant muscle atrophy. BICEPS TENDON: Intact vertical and horizontal portions of the long head of the biceps tendon. LABRUM: Tear of the anterior labrum extending along the anterior superior and anterior inferior labrum. Degenerative signal of the posterior labrum as well. GLENOHUMERAL JOINT: No significant joint effusion. Unremarkable alignment. No significant subchondral cystic changes. Suspect mild cartilage thinning at the inferior glenohumeral joint. AC JOINT AND ACROMIOCLAVICULAR ARCH: Acromion shape: The acromion is curved. Subacromial spur: No Arthritis: Capsular hypertrophy with osteophytes and reactive bone marrow edema BONE MARROW: No evidence of fracture. Normal marrow signal. OUTLET SPACES: Mild subacromial/subdeltoid bursitis. Unremarkable MRI appearance of the quadrilateral space. Mild narrowing of the supraspinatus outlet. Normal appearing rotator interval.     1. Intermediate grade partial-thickness interstitial tear of the supraspinatus tendon footprint and strain/muscle tear with mild muscle edema near the myotendinous junction. No full-thickness retracted tear or muscle atrophy. 2. Multifocal likely degenerative tearing of the labrum. 3. Impingement factors with mild subacromial/subdeltoid bursitis. Arnoldo Street was seen today for shoulder pain. Diagnoses and all orders for this visit:    Arnoldo Street was seen today for shoulder pain.     Diagnoses and all orders for this visit:    Nontraumatic tear of left rotator cuff, unspecified tear extent    Exercise counseling    Impingement syndrome of left shoulder    Other orders  -     ID ARTHROCENTESIS ASPIR&/INJ MAJOR JT/BURSA

## 2022-05-03 ENCOUNTER — HOSPITAL ENCOUNTER (OUTPATIENT)
Dept: CT IMAGING | Age: 62
Discharge: HOME | End: 2022-05-03
Payer: MEDICARE

## 2022-05-03 DIAGNOSIS — F17.200 TOBACCO USE DISORDER: ICD-10-CM

## 2022-05-03 PROCEDURE — 71271 CT THORAX LUNG CANCER SCR C-: CPT

## 2022-06-20 ENCOUNTER — TELEPHONE (OUTPATIENT)
Dept: CASE MANAGEMENT | Age: 62
End: 2022-06-20

## 2022-06-20 NOTE — TELEPHONE ENCOUNTER
No call, encounter opened to process CT Lung Screening. CT Lung Screen: 5/3/2022    Impression   1. There is no pulmonary infiltrate, mass or suspicious pulmonary nodule   2. Mild emphysematous changes.       LUNG RADS:   Per ACR Lung-RADS Version 1.1       Category 1, Negative (No nodules and definitely benign nodules).       Management: Continue annual lung screening with LDCT in 12 months.       RECOMMENDATIONS:   If you would like to register your patient with the [facility], please contact the Nurse Navigator at   [phone]. Pack years:     Social History     Tobacco Use  Smoking Status: Current Every Day Smoker    Start Date:    Quit Date:    Types: Cigarettes   Packs/Day: 1   Years:    Pack Years:    Smokeless Tobacco: Never Used         Results letter sent to patient via my chart or mailed.      [address]

## 2025-03-18 ENCOUNTER — HOSPITAL ENCOUNTER (OUTPATIENT)
Dept: SLEEP CENTER | Age: 65
Discharge: HOME | End: 2025-03-18
Payer: MEDICARE

## 2025-03-18 DIAGNOSIS — G47.33 OSA (OBSTRUCTIVE SLEEP APNEA): ICD-10-CM

## 2025-03-18 DIAGNOSIS — G47.33 OSA (OBSTRUCTIVE SLEEP APNEA): Primary | ICD-10-CM

## 2025-03-18 PROCEDURE — 95811 POLYSOM 6/>YRS CPAP 4/> PARM: CPT

## 2025-03-31 ENCOUNTER — HOSPITAL ENCOUNTER (OUTPATIENT)
Dept: CARDIAC REHAB | Age: 65
Setting detail: THERAPIES SERIES
Discharge: HOME | End: 2025-03-31
Payer: MEDICARE

## 2025-03-31 VITALS — HEIGHT: 63 IN | BODY MASS INDEX: 28.51 KG/M2 | OXYGEN SATURATION: 98 % | WEIGHT: 160.9 LBS

## 2025-03-31 PROCEDURE — 93798 PHYS/QHP OP CAR RHAB W/ECG: CPT

## 2025-03-31 PROCEDURE — 93797 PHYS/QHP OP CAR RHAB WO ECG: CPT

## 2025-03-31 RX ORDER — LOVASTATIN 20 MG/1
20 TABLET ORAL NIGHTLY
COMMUNITY

## 2025-03-31 ASSESSMENT — PATIENT HEALTH QUESTIONNAIRE - PHQ9
3. TROUBLE FALLING OR STAYING ASLEEP: SEVERAL DAYS
8. MOVING OR SPEAKING SO SLOWLY THAT OTHER PEOPLE COULD HAVE NOTICED. OR THE OPPOSITE, BEING SO FIGETY OR RESTLESS THAT YOU HAVE BEEN MOVING AROUND A LOT MORE THAN USUAL: NOT AT ALL
SUM OF ALL RESPONSES TO PHQ QUESTIONS 1-9: 5
2. FEELING DOWN, DEPRESSED OR HOPELESS: SEVERAL DAYS
SUM OF ALL RESPONSES TO PHQ QUESTIONS 1-9: 5
4. FEELING TIRED OR HAVING LITTLE ENERGY: SEVERAL DAYS
10. IF YOU CHECKED OFF ANY PROBLEMS, HOW DIFFICULT HAVE THESE PROBLEMS MADE IT FOR YOU TO DO YOUR WORK, TAKE CARE OF THINGS AT HOME, OR GET ALONG WITH OTHER PEOPLE: SOMEWHAT DIFFICULT
1. LITTLE INTEREST OR PLEASURE IN DOING THINGS: SEVERAL DAYS
SUM OF ALL RESPONSES TO PHQ QUESTIONS 1-9: 5
7. TROUBLE CONCENTRATING ON THINGS, SUCH AS READING THE NEWSPAPER OR WATCHING TELEVISION: NOT AT ALL
6. FEELING BAD ABOUT YOURSELF - OR THAT YOU ARE A FAILURE OR HAVE LET YOURSELF OR YOUR FAMILY DOWN: NOT AT ALL
9. THOUGHTS THAT YOU WOULD BE BETTER OFF DEAD, OR OF HURTING YOURSELF: NOT AT ALL
5. POOR APPETITE OR OVEREATING: SEVERAL DAYS
SUM OF ALL RESPONSES TO PHQ QUESTIONS 1-9: 5

## 2025-03-31 ASSESSMENT — NEW YORK HEART ASSOCIATION (NYHA) CLASSIFICATION: NYHA FUNCTIONAL CLASS: NO NYHA CLASS OR UNABLE TO DETERMINE

## 2025-03-31 NOTE — PROGRESS NOTES
03/31/25 1300   Treatment Diagnosis   Treatment Diagnosis 1 PAD   PAD Date 09/22/23   Referral Date 03/10/25   Significant Cardiovascular History Peripheral arterial disease;Previous myocardial infarction   NYHA Heart Failure Classification No NYHA class or unable to determine   Co-morbidities Peripheral arterial disease;Previous MI;Pulmonary disease  (Pre Diabetic, Emphysema)   Individual Treatment Plan   ITP Visit Type Initial assessment   ITP Next Review Date 04/23/25   Visit #/Total Visits 2/36   EF%   (No value)   Risk Stratification Low   Supplemental Oxygen   Oxygen Use No   Exercise Assessment   Stages of Change Action   DASI Total Score 26.95   Assisted Devices None   Test Six minute walk test   Data Measured Before Test   Heart Rate 63   Resting Blood Pressure 104/70   O2 Saturation 98   O2 Device Room air   RPD 0   RPE 6   Data Measured During Test   Indicate Mode of RPE 3 minutes   Modality Track   Track Distance 342 meters   METS 2   Symptoms Leg pain;SOB   Problems While Exercising Patient denies   Describe Type of Pain You Are Having Sharp hip pain in both legs.   Blood Pressure 104/74   SpO2 93 %   RPE 8   RPD 2   Data Measured at Peak   Distance Calculated in  m   Distance (meters) 342 meters   Peak Heart Rate 104   Peak Blood Pressure 106/74   Peak RPE 8   SpO2 96   Data Measured at 5 Minutes After Test   Heart Rate 61   Blood Pressure 100/70   SpO2 98 %   O2 Device Room air   Comments Patient successfully completed the 6MWT without any issues or complications. Patient reports sharp hip pain and slight SOB.   Exercise Intervention/Prescription   Mode Treadmill   Frequency per week 2   Duration Per Session 30 minutes   Intensity METS       2-4   RPE 12-14   Progression To gradually increase walking distance by exercising on the treadmill 2x/week at a moderate pace, while managing pain levels to a mild-moderate discomfort level during exercise.   Symptoms with Exercise Shortness of breath;Leg pain

## 2025-04-02 ENCOUNTER — APPOINTMENT (OUTPATIENT)
Dept: CARDIAC REHAB | Age: 65
End: 2025-04-02
Payer: MEDICARE

## 2025-04-04 ENCOUNTER — HOSPITAL ENCOUNTER (OUTPATIENT)
Dept: CARDIAC REHAB | Age: 65
Setting detail: THERAPIES SERIES
Discharge: HOME | End: 2025-04-04
Payer: MEDICARE

## 2025-04-04 PROCEDURE — 93668 PERIPHERAL VASCULAR REHAB: CPT

## 2025-04-09 ENCOUNTER — HOSPITAL ENCOUNTER (OUTPATIENT)
Dept: CARDIAC REHAB | Age: 65
Setting detail: THERAPIES SERIES
Discharge: HOME | End: 2025-04-09
Payer: MEDICARE

## 2025-04-09 PROCEDURE — 93668 PERIPHERAL VASCULAR REHAB: CPT

## 2025-04-11 ENCOUNTER — HOSPITAL ENCOUNTER (OUTPATIENT)
Dept: CARDIAC REHAB | Age: 65
Setting detail: THERAPIES SERIES
Discharge: HOME | End: 2025-04-11
Payer: MEDICARE

## 2025-04-11 PROCEDURE — 93668 PERIPHERAL VASCULAR REHAB: CPT

## 2025-04-16 ENCOUNTER — HOSPITAL ENCOUNTER (OUTPATIENT)
Dept: CARDIAC REHAB | Age: 65
Setting detail: THERAPIES SERIES
Discharge: HOME | End: 2025-04-16
Payer: MEDICARE

## 2025-04-16 PROCEDURE — 93668 PERIPHERAL VASCULAR REHAB: CPT

## 2025-04-23 ENCOUNTER — HOSPITAL ENCOUNTER (OUTPATIENT)
Dept: CARDIAC REHAB | Age: 65
Setting detail: THERAPIES SERIES
Discharge: HOME | End: 2025-04-23
Payer: MEDICARE

## 2025-04-23 PROCEDURE — 93668 PERIPHERAL VASCULAR REHAB: CPT

## 2025-04-23 ASSESSMENT — PATIENT HEALTH QUESTIONNAIRE - PHQ9
3. TROUBLE FALLING OR STAYING ASLEEP: NOT AT ALL
SUM OF ALL RESPONSES TO PHQ QUESTIONS 1-9: 4
8. MOVING OR SPEAKING SO SLOWLY THAT OTHER PEOPLE COULD HAVE NOTICED. OR THE OPPOSITE, BEING SO FIGETY OR RESTLESS THAT YOU HAVE BEEN MOVING AROUND A LOT MORE THAN USUAL: NOT AT ALL
5. POOR APPETITE OR OVEREATING: NOT AT ALL
6. FEELING BAD ABOUT YOURSELF - OR THAT YOU ARE A FAILURE OR HAVE LET YOURSELF OR YOUR FAMILY DOWN: NOT AT ALL
SUM OF ALL RESPONSES TO PHQ QUESTIONS 1-9: 4
4. FEELING TIRED OR HAVING LITTLE ENERGY: MORE THAN HALF THE DAYS
2. FEELING DOWN, DEPRESSED OR HOPELESS: NOT AT ALL
SUM OF ALL RESPONSES TO PHQ QUESTIONS 1-9: 4
SUM OF ALL RESPONSES TO PHQ QUESTIONS 1-9: 4
10. IF YOU CHECKED OFF ANY PROBLEMS, HOW DIFFICULT HAVE THESE PROBLEMS MADE IT FOR YOU TO DO YOUR WORK, TAKE CARE OF THINGS AT HOME, OR GET ALONG WITH OTHER PEOPLE: NOT DIFFICULT AT ALL
7. TROUBLE CONCENTRATING ON THINGS, SUCH AS READING THE NEWSPAPER OR WATCHING TELEVISION: NOT AT ALL
9. THOUGHTS THAT YOU WOULD BE BETTER OFF DEAD, OR OF HURTING YOURSELF: NOT AT ALL
1. LITTLE INTEREST OR PLEASURE IN DOING THINGS: MORE THAN HALF THE DAYS

## 2025-04-23 ASSESSMENT — NEW YORK HEART ASSOCIATION (NYHA) CLASSIFICATION: NYHA FUNCTIONAL CLASS: NO NYHA CLASS OR UNABLE TO DETERMINE

## 2025-04-26 NOTE — PROGRESS NOTES
04/23/25 1400   Treatment Diagnosis   Treatment Diagnosis 1 PAD   PAD Date 09/22/23   Referral Date 03/10/25   Significant Cardiovascular History Peripheral arterial disease;Previous myocardial infarction   NYHA Heart Failure Classification No NYHA class or unable to determine   Co-morbidities Peripheral arterial disease;Previous MI;Pulmonary disease  (Pre Diabetic, Emphysema)   Individual Treatment Plan   ITP Visit Type Re-assessment   ITP Next Review Date 05/14/25   Visit #/Total Visits 7/36   EF%   (No value)   Risk Stratification Low   Supplemental Oxygen   Oxygen Use No   Exercise Assessment   Stages of Change Action   Assisted Devices None   Test Reassessed patient using current exercise prescription   Data Measured Before Test   Heart Rate 60   Resting Blood Pressure 116/78   O2 Device Room air   Data Measured During Test   Modality Treadmill;Stepper   Treadmill Speed 1.8 mph   Treadmill Grade 1 %   Stepper RPM 61   METS 2.1   Symptoms Leg pain   Problems While Exercising Patient reported moderate hip pain while walking that resolved with rest.   Blood Pressure 120/82   O2 Device Room air   RPE 15   Data Measured at Peak   Distance Calculated in  mi   Distance (miles) 1.22   Distance (Feet-Calculated) 6441.6 ft   Peak Heart Rate 84   Peak Blood Pressure 120/82   Peak RPE 15   Data Measured at 5 Minutes After Test   Heart Rate 67   Blood Pressure 120/62   O2 Device Room air   Comments Patient is tolerating ExRx and progressions well.   Exercise Intervention/Prescription   Mode Treadmill;Stepper   Frequency per week 2   Duration Per Session 35-40 minutes   Intensity METS       2-4   Target RPE 9-15   Progression To gradually increase walking distance by exercising on the treadmill 2x/week at a moderate pace, while managing pain levels to a mild-moderate discomfort level during exercise.   Symptoms with Exercise Shortness of breath;Leg pain   Target Heart Rate   (114-147 (-20%))   Resistance Training

## 2025-05-09 ENCOUNTER — HOSPITAL ENCOUNTER (OUTPATIENT)
Dept: CARDIAC REHAB | Age: 65
Setting detail: THERAPIES SERIES
Discharge: HOME | End: 2025-05-09

## 2025-05-09 ASSESSMENT — NEW YORK HEART ASSOCIATION (NYHA) CLASSIFICATION: NYHA FUNCTIONAL CLASS: NO NYHA CLASS OR UNABLE TO DETERMINE

## 2025-05-09 NOTE — PROGRESS NOTES
05/09/25 0738   Treatment Diagnosis   Treatment Diagnosis 1 PAD   PAD Date 09/22/23   Referral Date 03/10/25   Significant Cardiovascular History Peripheral arterial disease;Previous myocardial infarction   NYHA Heart Failure Classification No NYHA class or unable to determine   Co-morbidities Peripheral arterial disease;Previous MI;Pulmonary disease  (Pre Diabetic, Emphysema)   Individual Treatment Plan   ITP Visit Type Discharge, did not complete program   ITP Next Review Date   (pt is being discharged at this time due to being no call no show.)   Visit #/Total Visits 7/36     Patient last attended session was 4/23/2025. Patient completed 7/36 sessions and has been no call no show since. If Patient were to choose to resume sessions, staff would be more than happy to accommodate.

## 2025-06-12 ENCOUNTER — OFFICE VISIT (OUTPATIENT)
Dept: SLEEP CENTER | Age: 65
End: 2025-06-12
Payer: MEDICARE

## 2025-06-12 VITALS
BODY MASS INDEX: 29.45 KG/M2 | DIASTOLIC BLOOD PRESSURE: 72 MMHG | TEMPERATURE: 98.2 F | WEIGHT: 166.23 LBS | SYSTOLIC BLOOD PRESSURE: 118 MMHG | HEIGHT: 63 IN | OXYGEN SATURATION: 98 % | HEART RATE: 71 BPM

## 2025-06-12 DIAGNOSIS — G47.33 OSA (OBSTRUCTIVE SLEEP APNEA): Primary | ICD-10-CM

## 2025-06-12 PROCEDURE — 3017F COLORECTAL CA SCREEN DOC REV: CPT | Performed by: INTERNAL MEDICINE

## 2025-06-12 PROCEDURE — 99214 OFFICE O/P EST MOD 30 MIN: CPT | Performed by: INTERNAL MEDICINE

## 2025-06-12 PROCEDURE — G8419 CALC BMI OUT NRM PARAM NOF/U: HCPCS | Performed by: INTERNAL MEDICINE

## 2025-06-12 PROCEDURE — 4004F PT TOBACCO SCREEN RCVD TLK: CPT | Performed by: INTERNAL MEDICINE

## 2025-06-12 PROCEDURE — G8427 DOCREV CUR MEDS BY ELIG CLIN: HCPCS | Performed by: INTERNAL MEDICINE

## 2025-06-12 RX ORDER — DICYCLOMINE HYDROCHLORIDE 10 MG/1
10 CAPSULE ORAL
COMMUNITY
Start: 2024-09-18

## 2025-06-12 RX ORDER — TIMOLOL MALEATE 5 MG/ML
1 SOLUTION/ DROPS OPHTHALMIC 2 TIMES DAILY
COMMUNITY
Start: 2025-06-01

## 2025-06-12 RX ORDER — BIMATOPROST 0.1 MG/ML
1 SOLUTION/ DROPS OPHTHALMIC NIGHTLY
COMMUNITY
Start: 2025-05-08

## 2025-06-12 ASSESSMENT — SLEEP AND FATIGUE QUESTIONNAIRES
HOW LIKELY ARE YOU TO NOD OFF OR FALL ASLEEP WHILE LYING DOWN TO REST IN THE AFTERNOON WHEN CIRCUMSTANCES PERMIT: SLIGHT CHANCE OF DOZING
HOW LIKELY ARE YOU TO NOD OFF OR FALL ASLEEP IN A CAR, WHILE STOPPED FOR A FEW MINUTES IN TRAFFIC: WOULD NEVER DOZE
ESS TOTAL SCORE: 6
HOW LIKELY ARE YOU TO NOD OFF OR FALL ASLEEP WHILE WATCHING TV: SLIGHT CHANCE OF DOZING
HOW LIKELY ARE YOU TO NOD OFF OR FALL ASLEEP WHILE SITTING INACTIVE IN A PUBLIC PLACE: WOULD NEVER DOZE
HOW LIKELY ARE YOU TO NOD OFF OR FALL ASLEEP WHILE SITTING AND READING: MODERATE CHANCE OF DOZING
HOW LIKELY ARE YOU TO NOD OFF OR FALL ASLEEP WHILE SITTING AND TALKING TO SOMEONE: SLIGHT CHANCE OF DOZING
HOW LIKELY ARE YOU TO NOD OFF OR FALL ASLEEP WHEN YOU ARE A PASSENGER IN A CAR FOR AN HOUR WITHOUT A BREAK: SLIGHT CHANCE OF DOZING
HOW LIKELY ARE YOU TO NOD OFF OR FALL ASLEEP WHILE SITTING QUIETLY AFTER LUNCH WITHOUT ALCOHOL: WOULD NEVER DOZE

## 2025-06-12 ASSESSMENT — ENCOUNTER SYMPTOMS
GASTROINTESTINAL NEGATIVE: 1
ALLERGIC/IMMUNOLOGIC NEGATIVE: 1
RESPIRATORY NEGATIVE: 1
EYES NEGATIVE: 1

## 2025-06-12 NOTE — PROGRESS NOTES
Patient to follow up with physician in 3 months. Orders for CPAP will be sent to [facility].    
Normal breath sounds.   Abdominal:      General: Bowel sounds are normal.      Palpations: Abdomen is soft.   Musculoskeletal:         General: Normal range of motion.      Cervical back: Normal range of motion and neck supple.   Skin:     General: Skin is warm.   Neurological:      General: No focal deficit present.      Mental Status: She is alert and oriented to person, place, and time.          Assessment/Plan:   Diagnosis Orders   1. TIFFANY (obstructive sleep apnea)  DME Order for CPAP as OP         She is advised to lose weight, avoid CNS depressants and the risk of driving. APAP 8-14 CM with ResMed F 30/small, EPR ordered.    Follow up in 3 months.   Electronically by Jose Antonio Garcia MD  on 6/12/25 at 1:12 PM EDT